# Patient Record
Sex: MALE | Race: WHITE | NOT HISPANIC OR LATINO | URBAN - METROPOLITAN AREA
[De-identification: names, ages, dates, MRNs, and addresses within clinical notes are randomized per-mention and may not be internally consistent; named-entity substitution may affect disease eponyms.]

---

## 2018-01-20 ENCOUNTER — EMERGENCY (EMERGENCY)
Facility: HOSPITAL | Age: 28
LOS: 1 days | Discharge: HOME | End: 2018-01-20
Attending: EMERGENCY MEDICINE
Payer: COMMERCIAL

## 2018-01-20 VITALS
TEMPERATURE: 98 F | DIASTOLIC BLOOD PRESSURE: 76 MMHG | HEIGHT: 68 IN | WEIGHT: 164.91 LBS | RESPIRATION RATE: 16 BRPM | OXYGEN SATURATION: 99 % | HEART RATE: 84 BPM | SYSTOLIC BLOOD PRESSURE: 128 MMHG

## 2018-01-20 DIAGNOSIS — Z98.890 OTHER SPECIFIED POSTPROCEDURAL STATES: Chronic | ICD-10-CM

## 2018-01-20 PROCEDURE — 93010 ELECTROCARDIOGRAM REPORT: CPT

## 2018-01-20 PROCEDURE — 93005 ELECTROCARDIOGRAM TRACING: CPT

## 2018-01-20 PROCEDURE — 99283 EMERGENCY DEPT VISIT LOW MDM: CPT | Mod: 25

## 2018-01-20 PROCEDURE — 99284 EMERGENCY DEPT VISIT MOD MDM: CPT

## 2018-01-20 NOTE — ED ADULT TRIAGE NOTE - CHIEF COMPLAINT QUOTE
Patient reports feeling like he was going to pass out and sat down in kitchen to drink H20, patient reports he passed out sitting in chair witnessed by family. Patient denies PMH, denies any active pain or discomfort. reports malaise.

## 2018-01-20 NOTE — ED PROVIDER NOTE - MEDICAL DECISION MAKING DETAILS
most likely vasovagal syncope; will fu ekg if wnl will dc with pmd/cards follow up ; gave pt an option to get V hydration and labs or he can hydrate at home- pt has no symptoms at this time prefers to go home if ekg wnl--will dc

## 2018-01-20 NOTE — ED PROVIDER NOTE - OBJECTIVE STATEMENT
27 year old with past history of syncope presents to the ER with an episode of syncope that occurred two hours prior. Father who witnessed the episode of syncope was with patient and states the patient loss consciousness for 15 seconds, the patient did not have altered mental status after gaining consciousness, and no extreme body movement occurred during LOC. Patient denies any loss of strength or movement of the extremities. Patient denies any paralysis, metallic test, or nuchal rigidity. 27 year old with past history of UC presents to the ER with an episode of syncope that occurred two hours prior. Father who witnessed the episode of syncope was with patient and states the patient loss consciousness for 15 seconds, the patient did not have altered mental status after gaining consciousness, and no extreme body movement occurred during LOC, no shaking. pt notes that has felt the syncope episode coming on - felt pressure to the whole body/ felt like will pass out got hot went to the kitchen to get water. sat down on the chair no head trauma. Denies f/c/n/v/cp/sob/palpitations/ cough/rash/headache/abd.pain/d/c/dysuria/hematuria. notes similar syncope episode in the past - due to dehydration; notes that feels a litle dehydrated again as has not been drinking water for the last few days.

## 2018-01-20 NOTE — ED PROVIDER NOTE - NEUROLOGICAL, MLM
Alert and oriented, no focal deficits, no motor or sensory deficits. cn ii-xii intact, normal finger to nose normal gait

## 2018-01-20 NOTE — ED ADULT NURSE NOTE - OBJECTIVE STATEMENT
patient brought in by family after patient have a syncopal episode that lasted approx 15 seconds. patient stated before it happen he had ringing ears. patient c/o mild headache 2/10. denies [id number] at this time. patient brought in by family after patient have a syncopal episode that lasted approx 15 seconds. patient stated before it happen he had ringing ears. patient c/o mild headache 2/10. denies dizziness at this time.

## 2018-01-24 ENCOUNTER — NON-APPOINTMENT (OUTPATIENT)
Age: 28
End: 2018-01-24

## 2018-01-24 ENCOUNTER — APPOINTMENT (OUTPATIENT)
Dept: CARDIOLOGY | Facility: CLINIC | Age: 28
End: 2018-01-24
Payer: COMMERCIAL

## 2018-01-24 VITALS
HEART RATE: 95 BPM | OXYGEN SATURATION: 95 % | HEIGHT: 68 IN | BODY MASS INDEX: 24.25 KG/M2 | DIASTOLIC BLOOD PRESSURE: 77 MMHG | WEIGHT: 160 LBS | SYSTOLIC BLOOD PRESSURE: 119 MMHG

## 2018-01-24 DIAGNOSIS — Z87.19 PERSONAL HISTORY OF OTHER DISEASES OF THE DIGESTIVE SYSTEM: ICD-10-CM

## 2018-01-24 DIAGNOSIS — Z87.898 PERSONAL HISTORY OF OTHER SPECIFIED CONDITIONS: ICD-10-CM

## 2018-01-24 PROCEDURE — 99205 OFFICE O/P NEW HI 60 MIN: CPT

## 2018-01-24 PROCEDURE — 93000 ELECTROCARDIOGRAM COMPLETE: CPT

## 2018-01-31 ENCOUNTER — FORM ENCOUNTER (OUTPATIENT)
Age: 28
End: 2018-01-31

## 2018-01-31 ENCOUNTER — OUTPATIENT (OUTPATIENT)
Dept: OUTPATIENT SERVICES | Facility: HOSPITAL | Age: 28
LOS: 1 days | End: 2018-01-31
Payer: COMMERCIAL

## 2018-01-31 DIAGNOSIS — R00.2 PALPITATIONS: ICD-10-CM

## 2018-01-31 DIAGNOSIS — Z98.890 OTHER SPECIFIED POSTPROCEDURAL STATES: Chronic | ICD-10-CM

## 2018-01-31 PROCEDURE — 93016 CV STRESS TEST SUPVJ ONLY: CPT

## 2018-01-31 PROCEDURE — 93017 CV STRESS TEST TRACING ONLY: CPT

## 2018-01-31 PROCEDURE — 93018 CV STRESS TEST I&R ONLY: CPT

## 2018-02-01 ENCOUNTER — OUTPATIENT (OUTPATIENT)
Dept: OUTPATIENT SERVICES | Facility: HOSPITAL | Age: 28
LOS: 1 days | End: 2018-02-01
Payer: COMMERCIAL

## 2018-02-01 DIAGNOSIS — Z98.890 OTHER SPECIFIED POSTPROCEDURAL STATES: Chronic | ICD-10-CM

## 2018-02-01 DIAGNOSIS — R00.2 PALPITATIONS: ICD-10-CM

## 2018-02-01 PROCEDURE — 93306 TTE W/DOPPLER COMPLETE: CPT

## 2018-02-01 PROCEDURE — 93306 TTE W/DOPPLER COMPLETE: CPT | Mod: 26

## 2018-07-06 ENCOUNTER — APPOINTMENT (OUTPATIENT)
Dept: CARDIOLOGY | Facility: CLINIC | Age: 28
End: 2018-07-06

## 2019-02-16 ENCOUNTER — TRANSCRIPTION ENCOUNTER (OUTPATIENT)
Age: 29
End: 2019-02-16

## 2019-05-11 ENCOUNTER — INPATIENT (INPATIENT)
Facility: HOSPITAL | Age: 29
LOS: 2 days | Discharge: HOME | DRG: 386 | End: 2019-05-14
Attending: STUDENT IN AN ORGANIZED HEALTH CARE EDUCATION/TRAINING PROGRAM | Admitting: STUDENT IN AN ORGANIZED HEALTH CARE EDUCATION/TRAINING PROGRAM
Payer: COMMERCIAL

## 2019-05-11 VITALS
RESPIRATION RATE: 20 BRPM | HEART RATE: 94 BPM | WEIGHT: 139.99 LBS | OXYGEN SATURATION: 98 % | DIASTOLIC BLOOD PRESSURE: 92 MMHG | SYSTOLIC BLOOD PRESSURE: 145 MMHG | HEIGHT: 68 IN | TEMPERATURE: 98 F

## 2019-05-11 DIAGNOSIS — Z98.890 OTHER SPECIFIED POSTPROCEDURAL STATES: Chronic | ICD-10-CM

## 2019-05-11 LAB
ALBUMIN SERPL ELPH-MCNC: 4 G/DL — SIGNIFICANT CHANGE UP (ref 3.3–5.2)
ALP SERPL-CCNC: 72 U/L — SIGNIFICANT CHANGE UP (ref 40–120)
ALT FLD-CCNC: 19 U/L — SIGNIFICANT CHANGE UP
ANION GAP SERPL CALC-SCNC: 13 MMOL/L — SIGNIFICANT CHANGE UP (ref 5–17)
AST SERPL-CCNC: 19 U/L — SIGNIFICANT CHANGE UP
BILIRUB SERPL-MCNC: 0.5 MG/DL — SIGNIFICANT CHANGE UP (ref 0.4–2)
BUN SERPL-MCNC: 7 MG/DL — LOW (ref 8–20)
CALCIUM SERPL-MCNC: 9.9 MG/DL — SIGNIFICANT CHANGE UP (ref 8.6–10.2)
CHLORIDE SERPL-SCNC: 99 MMOL/L — SIGNIFICANT CHANGE UP (ref 98–107)
CO2 SERPL-SCNC: 27 MMOL/L — SIGNIFICANT CHANGE UP (ref 22–29)
CREAT SERPL-MCNC: 0.76 MG/DL — SIGNIFICANT CHANGE UP (ref 0.5–1.3)
EOSINOPHIL NFR BLD AUTO: 3 % — SIGNIFICANT CHANGE UP (ref 0–6)
GLUCOSE SERPL-MCNC: 90 MG/DL — SIGNIFICANT CHANGE UP (ref 70–115)
HCT VFR BLD CALC: 36.9 % — LOW (ref 42–52)
HCT VFR BLD CALC: 42.3 % — SIGNIFICANT CHANGE UP (ref 42–52)
HGB BLD-MCNC: 12.4 G/DL — LOW (ref 14–18)
HGB BLD-MCNC: 14.3 G/DL — SIGNIFICANT CHANGE UP (ref 14–18)
LACTATE BLDV-MCNC: 0.9 MMOL/L — SIGNIFICANT CHANGE UP (ref 0.5–2)
LIDOCAIN IGE QN: 18 U/L — LOW (ref 22–51)
LYMPHOCYTES # BLD AUTO: 26 % — SIGNIFICANT CHANGE UP (ref 20–55)
MCHC RBC-ENTMCNC: 28.4 PG — SIGNIFICANT CHANGE UP (ref 27–31)
MCHC RBC-ENTMCNC: 28.6 PG — SIGNIFICANT CHANGE UP (ref 27–31)
MCHC RBC-ENTMCNC: 33.6 G/DL — SIGNIFICANT CHANGE UP (ref 32–36)
MCHC RBC-ENTMCNC: 33.8 G/DL — SIGNIFICANT CHANGE UP (ref 32–36)
MCV RBC AUTO: 84.6 FL — SIGNIFICANT CHANGE UP (ref 80–94)
MCV RBC AUTO: 84.6 FL — SIGNIFICANT CHANGE UP (ref 80–94)
MONOCYTES NFR BLD AUTO: 15 % — HIGH (ref 3–10)
NEUTROPHILS NFR BLD AUTO: 44 % — SIGNIFICANT CHANGE UP (ref 37–73)
NEUTS BAND # BLD: 5 % — SIGNIFICANT CHANGE UP (ref 0–8)
PLAT MORPH BLD: NORMAL — SIGNIFICANT CHANGE UP
PLATELET # BLD AUTO: 166 K/UL — SIGNIFICANT CHANGE UP (ref 150–400)
PLATELET # BLD AUTO: 208 K/UL — SIGNIFICANT CHANGE UP (ref 150–400)
POTASSIUM SERPL-MCNC: 4.1 MMOL/L — SIGNIFICANT CHANGE UP (ref 3.5–5.3)
POTASSIUM SERPL-SCNC: 4.1 MMOL/L — SIGNIFICANT CHANGE UP (ref 3.5–5.3)
PROT SERPL-MCNC: 7 G/DL — SIGNIFICANT CHANGE UP (ref 6.6–8.7)
RBC # BLD: 4.36 M/UL — LOW (ref 4.6–6.2)
RBC # BLD: 5 M/UL — SIGNIFICANT CHANGE UP (ref 4.6–6.2)
RBC # FLD: 12.4 % — SIGNIFICANT CHANGE UP (ref 11–15.6)
RBC # FLD: 12.4 % — SIGNIFICANT CHANGE UP (ref 11–15.6)
RBC BLD AUTO: NORMAL — SIGNIFICANT CHANGE UP
SODIUM SERPL-SCNC: 139 MMOL/L — SIGNIFICANT CHANGE UP (ref 135–145)
VARIANT LYMPHS # BLD: 7 % — HIGH (ref 0–6)
WBC # BLD: 5.3 K/UL — SIGNIFICANT CHANGE UP (ref 4.8–10.8)
WBC # BLD: 6.4 K/UL — SIGNIFICANT CHANGE UP (ref 4.8–10.8)
WBC # FLD AUTO: 5.3 K/UL — SIGNIFICANT CHANGE UP (ref 4.8–10.8)
WBC # FLD AUTO: 6.4 K/UL — SIGNIFICANT CHANGE UP (ref 4.8–10.8)

## 2019-05-11 PROCEDURE — 99285 EMERGENCY DEPT VISIT HI MDM: CPT

## 2019-05-11 PROCEDURE — 74177 CT ABD & PELVIS W/CONTRAST: CPT | Mod: 26

## 2019-05-11 RX ORDER — ACETAMINOPHEN 500 MG
975 TABLET ORAL ONCE
Refills: 0 | Status: COMPLETED | OUTPATIENT
Start: 2019-05-11 | End: 2019-05-11

## 2019-05-11 RX ORDER — SODIUM CHLORIDE 9 MG/ML
1000 INJECTION INTRAMUSCULAR; INTRAVENOUS; SUBCUTANEOUS ONCE
Refills: 0 | Status: COMPLETED | OUTPATIENT
Start: 2019-05-11 | End: 2019-05-11

## 2019-05-11 RX ORDER — SODIUM CHLORIDE 9 MG/ML
1000 INJECTION INTRAMUSCULAR; INTRAVENOUS; SUBCUTANEOUS
Refills: 0 | Status: DISCONTINUED | OUTPATIENT
Start: 2019-05-11 | End: 2019-05-12

## 2019-05-11 RX ADMIN — SODIUM CHLORIDE 1000 MILLILITER(S): 9 INJECTION INTRAMUSCULAR; INTRAVENOUS; SUBCUTANEOUS at 20:30

## 2019-05-11 RX ADMIN — Medication 975 MILLIGRAM(S): at 21:00

## 2019-05-11 RX ADMIN — Medication 975 MILLIGRAM(S): at 20:55

## 2019-05-11 RX ADMIN — SODIUM CHLORIDE 1000 MILLILITER(S): 9 INJECTION INTRAMUSCULAR; INTRAVENOUS; SUBCUTANEOUS at 20:55

## 2019-05-11 RX ADMIN — SODIUM CHLORIDE 2000 MILLILITER(S): 9 INJECTION INTRAMUSCULAR; INTRAVENOUS; SUBCUTANEOUS at 16:45

## 2019-05-11 NOTE — ED ADULT NURSE NOTE - OBJECTIVE STATEMENT
Patient presents to ER complaining of lower abdominal pain X 2 days, reports bloody stools this AM, denies fever, no N/V, resp even/unlabored, lungs CTAB, HX of ulcerative colitis.

## 2019-05-11 NOTE — ED STATDOCS - OBJECTIVE STATEMENT
27 y/o M pt with PMHx ulcerative colitis presents to the ED c/o abdominal pain beginning 4 days ago.  He had episodes of vomiting 6 days ago, then felt well for 2 days, and 4 days ago began to feel gradual onset, waxing and waning, epigastric cramping radiating to his LLQ.  Pt reports watery diarrhea for the past 4 days, and today began to have bloody diarrhea.  States his current pain is 2/10.  Pt's last CT was approx 5+ years ago.  Denies fever, chills, ear pain, throat pain, CP, SOB, dysuria, hematuria, back pain, HA, visual changes.  No further acute complaints at this time.

## 2019-05-11 NOTE — ED STATDOCS - PROGRESS NOTE DETAILS
28 year old male with PMHx of [place] presents to the ED c/o 4 days of LLQ abd pain associated with vomiting and bloody diarrhea.  Pt used [facility] in [place]. LLQ TTP. HPI/ ROS/ PEx as stated above. CT abd and labs ordered. 28 year old male with PMHx of US presents to the ED c/o 4 days of LLQ abd pain associated with vomiting and bloody diarrhea.  Pt sees [facility] in [place]. LLQ TTP. HPI/ ROS/ PEx as stated above. CT abd and labs ordered. signout from [facility] consult placed after reviewing ct abd/pelvis will admit to surgery

## 2019-05-11 NOTE — ED STATDOCS - ATTENDING CONTRIBUTION TO CARE
I, Dr. Olivo, performed the initial face to face bedside interview with this patient regarding history of present illness, review of symptoms and relevant past medical, social and family history.  I completed an independent physical examination.  I was the initial provider who evaluated this patient. I have signed out the follow up of any pending tests (i.e. labs, radiological studies) to the ACP.  I have communicated the patient’s plan of care and disposition with the ACP.

## 2019-05-11 NOTE — ED ADULT NURSE NOTE - NSIMPLEMENTINTERV_GEN_ALL_ED
Implemented All Universal Safety Interventions:  [place] to call system. Call bell, personal items and telephone within reach. Instruct patient to call for assistance. Room bathroom lighting operational. Non-slip footwear when patient is off stretcher. Physically safe environment: no spills, clutter or unnecessary equipment. Stretcher in lowest position, wheels locked, appropriate side rails in place.

## 2019-05-11 NOTE — ED STATDOCS - CHPI ED SYMPTOM NEG
no fever/no hematuria/chills, ear pain, throat pain, CP, SOB, back pain, HA, visual changes/no dysuria

## 2019-05-11 NOTE — ED ADULT TRIAGE NOTE - CHIEF COMPLAINT QUOTE
Patient arrived to ED today with c/o ulcerative colitis attack, diarrhea, abdominal cramping, sharp pains to his abdomen, and blood in his stools.

## 2019-05-12 DIAGNOSIS — R19.7 DIARRHEA, UNSPECIFIED: ICD-10-CM

## 2019-05-12 DIAGNOSIS — K56.1 INTUSSUSCEPTION: ICD-10-CM

## 2019-05-12 DIAGNOSIS — K51.011 ULCERATIVE (CHRONIC) PANCOLITIS WITH RECTAL BLEEDING: ICD-10-CM

## 2019-05-12 PROBLEM — K51.90 ULCERATIVE COLITIS, UNSPECIFIED, WITHOUT COMPLICATIONS: Chronic | Status: ACTIVE | Noted: 2018-01-20

## 2019-05-12 LAB
ANION GAP SERPL CALC-SCNC: 13 MMOL/L — SIGNIFICANT CHANGE UP (ref 5–17)
BASOPHILS # BLD AUTO: 0 K/UL — SIGNIFICANT CHANGE UP (ref 0–0.2)
BASOPHILS NFR BLD AUTO: 0.1 % — SIGNIFICANT CHANGE UP (ref 0–2)
BUN SERPL-MCNC: 5 MG/DL — LOW (ref 8–20)
CALCIUM SERPL-MCNC: 8.5 MG/DL — LOW (ref 8.6–10.2)
CHLORIDE SERPL-SCNC: 103 MMOL/L — SIGNIFICANT CHANGE UP (ref 98–107)
CO2 SERPL-SCNC: 24 MMOL/L — SIGNIFICANT CHANGE UP (ref 22–29)
CREAT SERPL-MCNC: 0.77 MG/DL — SIGNIFICANT CHANGE UP (ref 0.5–1.3)
EOSINOPHIL # BLD AUTO: 0.1 K/UL — SIGNIFICANT CHANGE UP (ref 0–0.5)
EOSINOPHIL NFR BLD AUTO: 1.3 % — SIGNIFICANT CHANGE UP (ref 0–5)
GLUCOSE SERPL-MCNC: 91 MG/DL — SIGNIFICANT CHANGE UP (ref 70–115)
HCT VFR BLD CALC: 38.3 % — LOW (ref 42–52)
HGB BLD-MCNC: 12.7 G/DL — LOW (ref 14–18)
LYMPHOCYTES # BLD AUTO: 1.3 K/UL — SIGNIFICANT CHANGE UP (ref 1–4.8)
LYMPHOCYTES # BLD AUTO: 18.7 % — LOW (ref 20–55)
MAGNESIUM SERPL-MCNC: 1.4 MG/DL — LOW (ref 1.6–2.6)
MCHC RBC-ENTMCNC: 28.3 PG — SIGNIFICANT CHANGE UP (ref 27–31)
MCHC RBC-ENTMCNC: 33.2 G/DL — SIGNIFICANT CHANGE UP (ref 32–36)
MCV RBC AUTO: 85.5 FL — SIGNIFICANT CHANGE UP (ref 80–94)
MONOCYTES # BLD AUTO: 1.2 K/UL — HIGH (ref 0–0.8)
MONOCYTES NFR BLD AUTO: 16.5 % — HIGH (ref 3–10)
NEUTROPHILS # BLD AUTO: 4.4 K/UL — SIGNIFICANT CHANGE UP (ref 1.8–8)
NEUTROPHILS NFR BLD AUTO: 63.4 % — SIGNIFICANT CHANGE UP (ref 37–73)
PHOSPHATE SERPL-MCNC: 3.3 MG/DL — SIGNIFICANT CHANGE UP (ref 2.4–4.7)
PLATELET # BLD AUTO: 177 K/UL — SIGNIFICANT CHANGE UP (ref 150–400)
POTASSIUM SERPL-MCNC: 3.8 MMOL/L — SIGNIFICANT CHANGE UP (ref 3.5–5.3)
POTASSIUM SERPL-SCNC: 3.8 MMOL/L — SIGNIFICANT CHANGE UP (ref 3.5–5.3)
RBC # BLD: 4.48 M/UL — LOW (ref 4.6–6.2)
RBC # FLD: 12.6 % — SIGNIFICANT CHANGE UP (ref 11–15.6)
SODIUM SERPL-SCNC: 140 MMOL/L — SIGNIFICANT CHANGE UP (ref 135–145)
WBC # BLD: 7 K/UL — SIGNIFICANT CHANGE UP (ref 4.8–10.8)
WBC # FLD AUTO: 7 K/UL — SIGNIFICANT CHANGE UP (ref 4.8–10.8)

## 2019-05-12 PROCEDURE — 99221 1ST HOSP IP/OBS SF/LOW 40: CPT

## 2019-05-12 PROCEDURE — 99222 1ST HOSP IP/OBS MODERATE 55: CPT

## 2019-05-12 PROCEDURE — 99232 SBSQ HOSP IP/OBS MODERATE 35: CPT

## 2019-05-12 RX ORDER — POTASSIUM CHLORIDE 20 MEQ
20 PACKET (EA) ORAL ONCE
Refills: 0 | Status: COMPLETED | OUTPATIENT
Start: 2019-05-12 | End: 2019-05-12

## 2019-05-12 RX ORDER — HYDROMORPHONE HYDROCHLORIDE 2 MG/ML
0.5 INJECTION INTRAMUSCULAR; INTRAVENOUS; SUBCUTANEOUS EVERY 4 HOURS
Refills: 0 | Status: DISCONTINUED | OUTPATIENT
Start: 2019-05-12 | End: 2019-05-14

## 2019-05-12 RX ORDER — PIPERACILLIN AND TAZOBACTAM 4; .5 G/20ML; G/20ML
3.38 INJECTION, POWDER, LYOPHILIZED, FOR SOLUTION INTRAVENOUS EVERY 8 HOURS
Refills: 0 | Status: DISCONTINUED | OUTPATIENT
Start: 2019-05-12 | End: 2019-05-14

## 2019-05-12 RX ORDER — SODIUM CHLORIDE 9 MG/ML
1000 INJECTION, SOLUTION INTRAVENOUS
Refills: 0 | Status: DISCONTINUED | OUTPATIENT
Start: 2019-05-12 | End: 2019-05-14

## 2019-05-12 RX ORDER — ENOXAPARIN SODIUM 100 MG/ML
40 INJECTION SUBCUTANEOUS DAILY
Refills: 0 | Status: DISCONTINUED | OUTPATIENT
Start: 2019-05-12 | End: 2019-05-14

## 2019-05-12 RX ORDER — HYDROCORTISONE 20 MG
100 TABLET ORAL THREE TIMES A DAY
Refills: 0 | Status: DISCONTINUED | OUTPATIENT
Start: 2019-05-12 | End: 2019-05-14

## 2019-05-12 RX ORDER — MAGNESIUM SULFATE 500 MG/ML
2 VIAL (ML) INJECTION ONCE
Refills: 0 | Status: COMPLETED | OUTPATIENT
Start: 2019-05-12 | End: 2019-05-12

## 2019-05-12 RX ORDER — SODIUM CHLORIDE 9 MG/ML
1000 INJECTION, SOLUTION INTRAVENOUS ONCE
Refills: 0 | Status: COMPLETED | OUTPATIENT
Start: 2019-05-12 | End: 2019-05-12

## 2019-05-12 RX ORDER — ACETAMINOPHEN 500 MG
650 TABLET ORAL EVERY 6 HOURS
Refills: 0 | Status: DISCONTINUED | OUTPATIENT
Start: 2019-05-12 | End: 2019-05-14

## 2019-05-12 RX ORDER — ONDANSETRON 8 MG/1
4 TABLET, FILM COATED ORAL EVERY 6 HOURS
Refills: 0 | Status: DISCONTINUED | OUTPATIENT
Start: 2019-05-12 | End: 2019-05-14

## 2019-05-12 RX ORDER — HYDROMORPHONE HYDROCHLORIDE 2 MG/ML
1 INJECTION INTRAMUSCULAR; INTRAVENOUS; SUBCUTANEOUS EVERY 4 HOURS
Refills: 0 | Status: DISCONTINUED | OUTPATIENT
Start: 2019-05-12 | End: 2019-05-14

## 2019-05-12 RX ADMIN — SODIUM CHLORIDE 2000 MILLILITER(S): 9 INJECTION, SOLUTION INTRAVENOUS at 05:05

## 2019-05-12 RX ADMIN — SODIUM CHLORIDE 75 MILLILITER(S): 9 INJECTION INTRAMUSCULAR; INTRAVENOUS; SUBCUTANEOUS at 00:30

## 2019-05-12 RX ADMIN — PIPERACILLIN AND TAZOBACTAM 25 GRAM(S): 4; .5 INJECTION, POWDER, LYOPHILIZED, FOR SOLUTION INTRAVENOUS at 08:16

## 2019-05-12 RX ADMIN — Medication 650 MILLIGRAM(S): at 12:23

## 2019-05-12 RX ADMIN — Medication 50 MILLIEQUIVALENT(S): at 14:00

## 2019-05-12 RX ADMIN — PIPERACILLIN AND TAZOBACTAM 25 GRAM(S): 4; .5 INJECTION, POWDER, LYOPHILIZED, FOR SOLUTION INTRAVENOUS at 23:09

## 2019-05-12 RX ADMIN — SODIUM CHLORIDE 150 MILLILITER(S): 9 INJECTION, SOLUTION INTRAVENOUS at 06:13

## 2019-05-12 RX ADMIN — Medication 650 MILLIGRAM(S): at 12:47

## 2019-05-12 RX ADMIN — PIPERACILLIN AND TAZOBACTAM 25 GRAM(S): 4; .5 INJECTION, POWDER, LYOPHILIZED, FOR SOLUTION INTRAVENOUS at 16:27

## 2019-05-12 RX ADMIN — Medication 50 GRAM(S): at 12:57

## 2019-05-12 RX ADMIN — Medication 100 MILLIGRAM(S): at 22:38

## 2019-05-12 RX ADMIN — SODIUM CHLORIDE 150 MILLILITER(S): 9 INJECTION, SOLUTION INTRAVENOUS at 22:38

## 2019-05-12 NOTE — H&P ADULT - PROBLEM SELECTOR PLAN 1
-admit to ACS  -NPO/IVF  -blood cultures  -zosyn  -repeat CT imaging to assess if intussusception still present  -GI consultation  -dvt ppx

## 2019-05-12 NOTE — CONSULT NOTE ADULT - PROBLEM SELECTOR RECOMMENDATION 9
with mild intermitant bleeding only. The patients's presentation is most consistant with progression of his ulcerative colitis to invole the entire colon with a flare. Underlying superimposed infectious colitis should be excluded but is unlikely. He will submit stool studeis for infection and I will start solucortef. Agree with clear liquid diet.

## 2019-05-12 NOTE — H&P ADULT - HISTORY OF PRESENT ILLNESS
28 year old male with history of ulcerative colitis presents to ED with one day history of severe, sharp abdominal pain associated with bright red blood per rectum. Since onset, he has had more than 10 episodes of blood per rectum and diarrhea. His pain is sharp, localized to the epigastric and left lower quadrant and does not radiate anywhere. Pain seemed to be temporarily relieved after a bowel movement. He feels nauseous but did not experience emesis. last meal was last night prior to admission. Never experienced this amount of blood per rectum or severe abdominal pain. Stopped taking ulcerative colitis medication a few weeks ago. Last colonoscopy was about 3 years ago and demonstrated ulcers. Usually has an exacerbation about once a month. GI physician is Dr. Funk

## 2019-05-12 NOTE — ED ADULT NURSE REASSESSMENT NOTE - NS ED NURSE REASSESS COMMENT FT1
Patient complaining of pain to right arm in area of saline lock. Stated is radiating up arm. Negative s/s phlebitis or infiltration. Saline lock removed, new IV site obtained. Patient verbalized an improvement in pain once saline lock removed from right arm. Will continue to monitor.

## 2019-05-12 NOTE — CONSULT NOTE ADULT - SUBJECTIVE AND OBJECTIVE BOX
Patient is a 28y old  Male who presents with a chief complaint of possible intussusception (12 May 2019 01:14)      HPI:  28 year old male with history of ulcerative colitis diagnosed 12 years ago treated with colozal in the past as well as urceris which resulted in constipation. The UC was apparently left sided. No recent colonoscopies, last about three years ago and followed by Dr. Funk. He usually experiences about three BMs per day, usually formed but with occasional cramping and a loose stool. Over the years he would sometimes see a small amount of blood in his stool. He was contolled for several years on Colozal but balsalazide was ineffective. He was seen by Dr. Funk about one month with ongoing symptoms and was prescirbed balsalzide which was ineffective and was stopped recently by the patient. Four days ago the diarrhea and cramping worsened and on this occasion the cramping was pronounced in the upper abdomen. He came to the ER yesterday and for the first time has been febrile to as high as 102. He notes small amount of blood int he stool  on about half of his BMs which are occuring every 90 minutes. There is no vomiting. Other than colozal. balsalized and uceris he has not been on any other med. The cat scan shows diffuse colitis and an intussusception of a loop of small bowel without obstruction. He has not been on any antibiotics recently and no recent travel. He works for the [employer]. No similarly afflicted family members.    REVIEW OF SYSTEMS:    CONSTITUTIONAL: No weight loss, or fatigue, + fever  EYES: No eye pain, visual disturbances, or discharge  ENMT:  No difficulty hearing, tinnitus, vertigo; No sinus or throat pain  NECK: No pain or stiffness  RESPIRATORY: No cough, wheezing, chills or hemoptysis; No shortness of breath  CARDIOVASCULAR: No chest pain, palpitations, dizziness, or leg swelling  GASTROINTESTINAL: as above  NEUROLOGICAL: No headaches, memory loss, loss of strength, numbness, or tremors  SKIN: No itching, burning, rashes, or lesions   LYMPH NODES: No enlarged glands  MUSCULOSKELETAL: No joint pain or swelling; No muscle, back, or extremity pain  PSYCHIATRIC: No depression, anxiety, mood swings, or difficulty sleeping  HEME/LYMPH: No easy bruising, or bleeding gums  ALLERY AND IMMUNOLOGIC: No hives or eczema      PAST MEDICAL & SURGICAL HISTORY:  Ulcerative colitis without complications, unspecified location  H/O right  shoulder surgery      FAMILY HISTORY:      SOCIAL HISTORY:  Smoking Status: [ ] Current, [ ] Former, [x ] Never  Pack Years:  Alcohol Use: social    Home Medications:      MEDICATIONS:  MEDICATIONS  (STANDING):  enoxaparin Injectable 40 milliGRAM(s) SubCutaneous daily  hydrocortisone sodium succinate Injectable 100 milliGRAM(s) IV Push three times a day  lactated ringers. 1000 milliLiter(s) (150 mL/Hr) IV Continuous <Continuous>  piperacillin/tazobactam IVPB. 3.375 Gram(s) IV Intermittent every 8 hours    MEDICATIONS  (PRN):  acetaminophen   Tablet .. 650 milliGRAM(s) Oral every 6 hours PRN Temp greater or equal to 38C (100.4F), Mild Pain (1 - 3)  HYDROmorphone  Injectable 0.5 milliGRAM(s) IV Push every 4 hours PRN Moderate Pain (4 - 6)  HYDROmorphone  Injectable 1 milliGRAM(s) IV Push every 4 hours PRN Severe Pain (7 - 10)  ondansetron Injectable 4 milliGRAM(s) IV Push every 6 hours PRN Nausea and/or Vomiting      Allergies    No Known Allergies    Intolerances        Vital Signs Last 24 Hrs  T(C): 37.1 (12 May 2019 11:13), Max: 38.9 (11 May 2019 19:44)  T(F): 98.7 (12 May 2019 11:13), Max: 102 (11 May 2019 19:44)  HR: 100 (12 May 2019 13:33) (100 - 141)  BP: 124/75 (12 May 2019 13:33) (119/64 - 132/78)  BP(mean): 82 (12 May 2019 01:14) (82 - 82)  RR: 20 (12 May 2019 13:33) (18 - 20)  SpO2: 98% (12 May 2019 13:33) (97% - 99%)        PHYSICAL EXAM:    General: Well developed; well nourished; in no acute distress  HEENT: MMM, conjunctiva and sclera clear  Lungs: Clear  Heart: Rhythm Regular, No Murmurs, tachycardia  Gastrointestinal: Soft, with mild LLQ tenderness. non-distended; Normal bowel sounds; No rebound or guarding; No Organomegaly & No Masses  Extremities: Normal range of motion, No clubbing, cyanosis or edema  Neurological: Alert and oriented x3, Non-focal  Skin: Warm and dry. No obvious rash  Rectal: No Masses, scant loose nonbloody light brown stool      LABS:                        12.7   7.0   )-----------( 177      ( 12 May 2019 09:05 )             38.3     05-12    140  |  103  |  5.0<L>  ----------------------------<  91  3.8   |  24.0  |  0.77    Ca    8.5<L>      12 May 2019 09:05  Phos  3.3     05-12  Mg     1.4     05-12    TPro  7.0  /  Alb  4.0  /  TBili  0.5  /  DBili  x   /  AST  19  /  ALT  19  /  AlkPhos  72  05-11          RADIOLOGY & ADDITIONAL STUDIES:     < from: CT Abdomen and Pelvis w/ Oral Cont and w/ IV Cont (05.11.19 @ 20:36) >   EXAM:  CT ABDOMEN AND PELVIS OC IC                          PROCEDURE DATE:  05/11/2019          INTERPRETATION:  CLINICAL INFORMATION: History of ulcerative colitis.    PROCEDURE:   Helical axial images were obtained from the domes of the diaphragm   through the pubic symphysis following the administration of oral contrast   and intravenous contrast. 95 mls of Omnipaque-300 administered without   complication. Coronal and sagittal reformats were also obtained.    COMPARISON: 8/15/2014.    FINDINGS:    LOWER CHEST: Unremarkable.    LIVER: Unremarkable.  GALLBLADDER/BILE DUCTS: No intrahepatic or extrahepatic biliary   dilatation. No radiopaque gallstone.  PANCREAS: Unremarkable.  SPLEEN: A 1.3 x 1.4 cm hypodense lesion, increased in size since prior   study.    ADRENALS: Unremarkable.  KIDNEYS/URETERS: No hydronephrosis, hydroureter or significant   perinephric stranding. Right upper pole renal scarring again noted.  BLADDER: Partially distended.  REPRODUCTIVE ORGANS: Unremarkable.    BOWEL: Diffuse colon wall thickening with mild surrounding stranding or   increased mesenteric vascularity, indicating acute flareup of known   inflammatory bowel disease. Scattered chronic submucosal fat deposition   with loss  of haustra and in the sigmoid colon, reflecting chronic   component. Proximal small bowel-small bowel intussusception in the left   abdomen (3:62 and 5:41). No significant bowel dilatation or transition   point to suggest bowel obstruction. No secondary signs of appendicitis.    PERITONEUM: No drainable fluid collection or free air. Scattered   paracolic/mesorectal lymph nodes, for example, 1.1 x 1.5 cm left   mesorectal lymph node (3:112).  VESSELS: Unremarkable.   RETROPERITONEUM: No lymphadenopathy.    ABDOMINAL WALL/SOFT TISSUES: Unremarkable.  BONES: Degenerative changes of the spine.     IMPRESSION:     Acute flareup of known inflammatory bowel disease involving the entire   colon.    Scattered paracolic/mesorectal lymph nodes, which are nonspecific and may   be inflammatory or infectious in etiology. Recommend correlation with   colonoscopy to exclude underlying neoplasm.    Proximal small bowel-small bowel intussusception in the left abdomen. No   significant bowel dilatation or transition point to suggest bowel   obstruction. Recommend follow-up as clinically indicated.     Increased size of the indeterminate splenic lesion since 8/15/2014.   Recommend comparison to interval outside study and follow-up as   clinically indicated.                MONICO STONE M.D., ATTENDING RADIOLOGIST  This document has been electronically signed. May 11 2019  9:25PM                  < end of copied text >

## 2019-05-12 NOTE — H&P ADULT - ASSESSMENT
28 year old male with history of ulcerative colitis noncompliant with medication presenting with severe abdominal pain and multiple bloody bowel movements. CT imaging concerning for possible intussusception despite oral contrast visualized in the distal colon. Clinical picture more likely ulcerative colitis exacerbation given extensive inflammation of entire colon seen on imaging.

## 2019-05-12 NOTE — ED ADULT NURSE REASSESSMENT NOTE - NS ED NURSE REASSESS COMMENT FT1
Patient A&Ox4, denies any pain or discomfort. Denies any chest pain, shortness of breath, nausea or dizziness. Respirations even & unlabored, denies any numbness or tingling. Saline lock in place, patent, negative s/s phlebitis or infiltration. IVF in progress, well tolerated. Family at bedside. Handoff report given to oncoming RN.

## 2019-05-12 NOTE — CONSULT NOTE ADULT - CONSULT REASON
Hx of ulcerative colitis and now with diarrhea, fever, and blood in stool with intussusception of a segment of small bowel on cat scan

## 2019-05-12 NOTE — ED ADULT NURSE REASSESSMENT NOTE - NS ED NURSE REASSESS COMMENT FT1
Report received from off going RN, charting as noted. Patient A&Ox4, denies any pain or discomfort. Denies any chest pain, shortness of breath, nausea or dizziness. Respirations even & unlabored, denies any numbness or tingling. Cardiac monitor in place, sinus tach. Saline lock in place, patent, negative s/s phlebitis or infiltration. IVF in progress, well tolerated. Family at bedside. Plan of care discussed, all questions answered. Will monitor.

## 2019-05-12 NOTE — ED ADULT NURSE REASSESSMENT NOTE - NS ED NURSE REASSESS COMMENT FT1
assumed pt care this am, medicated as ordered, wait ready bed on medical floor. Pt comfortable at this time except for some cramps, chart reviewed, am  here for pt.

## 2019-05-13 LAB
ANION GAP SERPL CALC-SCNC: 15 MMOL/L — SIGNIFICANT CHANGE UP (ref 5–17)
BASOPHILS # BLD AUTO: 0 K/UL — SIGNIFICANT CHANGE UP (ref 0–0.2)
BASOPHILS NFR BLD AUTO: 0.2 % — SIGNIFICANT CHANGE UP (ref 0–2)
BUN SERPL-MCNC: 3 MG/DL — LOW (ref 8–20)
C DIFF BY PCR RESULT: SIGNIFICANT CHANGE UP
C DIFF TOX GENS STL QL NAA+PROBE: SIGNIFICANT CHANGE UP
CALCIUM SERPL-MCNC: 8.2 MG/DL — LOW (ref 8.6–10.2)
CHLORIDE SERPL-SCNC: 101 MMOL/L — SIGNIFICANT CHANGE UP (ref 98–107)
CO2 SERPL-SCNC: 25 MMOL/L — SIGNIFICANT CHANGE UP (ref 22–29)
CREAT SERPL-MCNC: 0.76 MG/DL — SIGNIFICANT CHANGE UP (ref 0.5–1.3)
CULTURE RESULTS: SIGNIFICANT CHANGE UP
EOSINOPHIL # BLD AUTO: 0 K/UL — SIGNIFICANT CHANGE UP (ref 0–0.5)
EOSINOPHIL NFR BLD AUTO: 0 % — SIGNIFICANT CHANGE UP (ref 0–6)
GLUCOSE SERPL-MCNC: 105 MG/DL — SIGNIFICANT CHANGE UP (ref 70–115)
HCT VFR BLD CALC: 37.3 % — LOW (ref 42–52)
HGB BLD-MCNC: 12.7 G/DL — LOW (ref 14–18)
LYMPHOCYTES # BLD AUTO: 1 K/UL — SIGNIFICANT CHANGE UP (ref 1–4.8)
LYMPHOCYTES # BLD AUTO: 16.3 % — LOW (ref 20–55)
MAGNESIUM SERPL-MCNC: 1.9 MG/DL — SIGNIFICANT CHANGE UP (ref 1.6–2.6)
MCHC RBC-ENTMCNC: 28.4 PG — SIGNIFICANT CHANGE UP (ref 27–31)
MCHC RBC-ENTMCNC: 34 G/DL — SIGNIFICANT CHANGE UP (ref 32–36)
MCV RBC AUTO: 83.4 FL — SIGNIFICANT CHANGE UP (ref 80–94)
MONOCYTES # BLD AUTO: 0.8 K/UL — SIGNIFICANT CHANGE UP (ref 0–0.8)
MONOCYTES NFR BLD AUTO: 13.1 % — HIGH (ref 3–10)
NEUTROPHILS # BLD AUTO: 4.1 K/UL — SIGNIFICANT CHANGE UP (ref 1.8–8)
NEUTROPHILS NFR BLD AUTO: 70.1 % — SIGNIFICANT CHANGE UP (ref 37–73)
PHOSPHATE SERPL-MCNC: 3.9 MG/DL — SIGNIFICANT CHANGE UP (ref 2.4–4.7)
PLATELET # BLD AUTO: 184 K/UL — SIGNIFICANT CHANGE UP (ref 150–400)
POTASSIUM SERPL-MCNC: 3.5 MMOL/L — SIGNIFICANT CHANGE UP (ref 3.5–5.3)
POTASSIUM SERPL-SCNC: 3.5 MMOL/L — SIGNIFICANT CHANGE UP (ref 3.5–5.3)
RBC # BLD: 4.47 M/UL — LOW (ref 4.6–6.2)
RBC # FLD: 12.3 % — SIGNIFICANT CHANGE UP (ref 11–15.6)
SODIUM SERPL-SCNC: 141 MMOL/L — SIGNIFICANT CHANGE UP (ref 135–145)
SPECIMEN SOURCE: SIGNIFICANT CHANGE UP
WBC # BLD: 5.9 K/UL — SIGNIFICANT CHANGE UP (ref 4.8–10.8)
WBC # FLD AUTO: 5.9 K/UL — SIGNIFICANT CHANGE UP (ref 4.8–10.8)

## 2019-05-13 PROCEDURE — 99231 SBSQ HOSP IP/OBS SF/LOW 25: CPT

## 2019-05-13 PROCEDURE — 99233 SBSQ HOSP IP/OBS HIGH 50: CPT

## 2019-05-13 PROCEDURE — 74177 CT ABD & PELVIS W/CONTRAST: CPT | Mod: 26

## 2019-05-13 RX ORDER — POTASSIUM CHLORIDE 20 MEQ
40 PACKET (EA) ORAL ONCE
Refills: 0 | Status: COMPLETED | OUTPATIENT
Start: 2019-05-13 | End: 2019-05-13

## 2019-05-13 RX ORDER — POTASSIUM CHLORIDE 20 MEQ
10 PACKET (EA) ORAL
Refills: 0 | Status: DISCONTINUED | OUTPATIENT
Start: 2019-05-13 | End: 2019-05-13

## 2019-05-13 RX ADMIN — PIPERACILLIN AND TAZOBACTAM 25 GRAM(S): 4; .5 INJECTION, POWDER, LYOPHILIZED, FOR SOLUTION INTRAVENOUS at 10:16

## 2019-05-13 RX ADMIN — Medication 100 MILLIGRAM(S): at 15:08

## 2019-05-13 RX ADMIN — Medication 100 MILLIEQUIVALENT(S): at 16:55

## 2019-05-13 RX ADMIN — PIPERACILLIN AND TAZOBACTAM 25 GRAM(S): 4; .5 INJECTION, POWDER, LYOPHILIZED, FOR SOLUTION INTRAVENOUS at 18:42

## 2019-05-13 RX ADMIN — Medication 100 MILLIGRAM(S): at 23:03

## 2019-05-13 RX ADMIN — Medication 100 MILLIGRAM(S): at 05:33

## 2019-05-13 RX ADMIN — SODIUM CHLORIDE 150 MILLILITER(S): 9 INJECTION, SOLUTION INTRAVENOUS at 23:02

## 2019-05-13 RX ADMIN — Medication 40 MILLIEQUIVALENT(S): at 18:41

## 2019-05-13 NOTE — PROGRESS NOTE ADULT - ATTENDING COMMENTS
Seen and examined    NAD  AAOx4  abd soft, NT    likely UC flair.  Improving with treatment as per GI.    Counselled to consider f/u with colorectal surgery as well.

## 2019-05-13 NOTE — PROGRESS NOTE ADULT - ASSESSMENT
A/P: 29yo M h/o of UC p/w flair and questionable intussusception on CT, though clinically does not seem to have intussusception    -follow up CT abd/pelv rescan today  -NPO for now, will resume CLD after scan  -zosyn and hydrocortisone  -dvt ppx  -plan pending CT scan

## 2019-05-14 ENCOUNTER — TRANSCRIPTION ENCOUNTER (OUTPATIENT)
Age: 29
End: 2019-05-14

## 2019-05-14 VITALS
DIASTOLIC BLOOD PRESSURE: 76 MMHG | SYSTOLIC BLOOD PRESSURE: 120 MMHG | OXYGEN SATURATION: 97 % | RESPIRATION RATE: 18 BRPM | HEART RATE: 70 BPM | TEMPERATURE: 98 F

## 2019-05-14 LAB
ANION GAP SERPL CALC-SCNC: 12 MMOL/L — SIGNIFICANT CHANGE UP (ref 5–17)
BUN SERPL-MCNC: 6 MG/DL — LOW (ref 8–20)
CALCIUM SERPL-MCNC: 8.4 MG/DL — LOW (ref 8.6–10.2)
CHLORIDE SERPL-SCNC: 101 MMOL/L — SIGNIFICANT CHANGE UP (ref 98–107)
CO2 SERPL-SCNC: 28 MMOL/L — SIGNIFICANT CHANGE UP (ref 22–29)
CREAT SERPL-MCNC: 0.73 MG/DL — SIGNIFICANT CHANGE UP (ref 0.5–1.3)
EOSINOPHIL # BLD AUTO: 0 K/UL — SIGNIFICANT CHANGE UP (ref 0–0.5)
EOSINOPHIL NFR BLD AUTO: 0 % — SIGNIFICANT CHANGE UP (ref 0–5)
GLUCOSE SERPL-MCNC: 118 MG/DL — HIGH (ref 70–115)
HCT VFR BLD CALC: 38.2 % — LOW (ref 42–52)
HGB BLD-MCNC: 12.9 G/DL — LOW (ref 14–18)
LYMPHOCYTES # BLD AUTO: 1.3 K/UL — SIGNIFICANT CHANGE UP (ref 1–4.8)
LYMPHOCYTES # BLD AUTO: 19 % — LOW (ref 20–55)
MAGNESIUM SERPL-MCNC: 2 MG/DL — SIGNIFICANT CHANGE UP (ref 1.6–2.6)
MCHC RBC-ENTMCNC: 28.5 PG — SIGNIFICANT CHANGE UP (ref 27–31)
MCHC RBC-ENTMCNC: 33.8 G/DL — SIGNIFICANT CHANGE UP (ref 32–36)
MCV RBC AUTO: 84.3 FL — SIGNIFICANT CHANGE UP (ref 80–94)
MONOCYTES # BLD AUTO: 1 K/UL — HIGH (ref 0–0.8)
MONOCYTES NFR BLD AUTO: 14.5 % — HIGH (ref 3–10)
NEUTROPHILS # BLD AUTO: 4.5 K/UL — SIGNIFICANT CHANGE UP (ref 1.8–8)
NEUTROPHILS NFR BLD AUTO: 66.1 % — SIGNIFICANT CHANGE UP (ref 37–73)
PHOSPHATE SERPL-MCNC: 3.6 MG/DL — SIGNIFICANT CHANGE UP (ref 2.4–4.7)
PLATELET # BLD AUTO: 213 K/UL — SIGNIFICANT CHANGE UP (ref 150–400)
POTASSIUM SERPL-MCNC: 4 MMOL/L — SIGNIFICANT CHANGE UP (ref 3.5–5.3)
POTASSIUM SERPL-SCNC: 4 MMOL/L — SIGNIFICANT CHANGE UP (ref 3.5–5.3)
RBC # BLD: 4.53 M/UL — LOW (ref 4.6–6.2)
RBC # FLD: 12.6 % — SIGNIFICANT CHANGE UP (ref 11–15.6)
SODIUM SERPL-SCNC: 141 MMOL/L — SIGNIFICANT CHANGE UP (ref 135–145)
WBC # BLD: 6.8 K/UL — SIGNIFICANT CHANGE UP (ref 4.8–10.8)
WBC # FLD AUTO: 6.8 K/UL — SIGNIFICANT CHANGE UP (ref 4.8–10.8)

## 2019-05-14 PROCEDURE — 87507 IADNA-DNA/RNA PROBE TQ 12-25: CPT

## 2019-05-14 PROCEDURE — 84100 ASSAY OF PHOSPHORUS: CPT

## 2019-05-14 PROCEDURE — 87493 C DIFF AMPLIFIED PROBE: CPT

## 2019-05-14 PROCEDURE — 83735 ASSAY OF MAGNESIUM: CPT

## 2019-05-14 PROCEDURE — 80048 BASIC METABOLIC PNL TOTAL CA: CPT

## 2019-05-14 PROCEDURE — 85027 COMPLETE CBC AUTOMATED: CPT

## 2019-05-14 PROCEDURE — 87177 OVA AND PARASITES SMEARS: CPT

## 2019-05-14 PROCEDURE — 87046 STOOL CULTR AEROBIC BACT EA: CPT

## 2019-05-14 PROCEDURE — 87045 FECES CULTURE AEROBIC BACT: CPT

## 2019-05-14 PROCEDURE — 87040 BLOOD CULTURE FOR BACTERIA: CPT

## 2019-05-14 PROCEDURE — 99238 HOSP IP/OBS DSCHRG MGMT 30/<: CPT

## 2019-05-14 PROCEDURE — 74177 CT ABD & PELVIS W/CONTRAST: CPT

## 2019-05-14 PROCEDURE — 80053 COMPREHEN METABOLIC PANEL: CPT

## 2019-05-14 PROCEDURE — 36415 COLL VENOUS BLD VENIPUNCTURE: CPT

## 2019-05-14 PROCEDURE — 83690 ASSAY OF LIPASE: CPT

## 2019-05-14 PROCEDURE — 99232 SBSQ HOSP IP/OBS MODERATE 35: CPT

## 2019-05-14 PROCEDURE — 99285 EMERGENCY DEPT VISIT HI MDM: CPT

## 2019-05-14 PROCEDURE — 83605 ASSAY OF LACTIC ACID: CPT

## 2019-05-14 RX ADMIN — Medication 50 MILLIGRAM(S): at 13:24

## 2019-05-14 RX ADMIN — Medication 100 MILLIGRAM(S): at 05:20

## 2019-05-14 RX ADMIN — PIPERACILLIN AND TAZOBACTAM 25 GRAM(S): 4; .5 INJECTION, POWDER, LYOPHILIZED, FOR SOLUTION INTRAVENOUS at 01:27

## 2019-05-14 NOTE — DISCHARGE NOTE PROVIDER - NSDCCPCAREPLAN_GEN_ALL_CORE_FT
PRINCIPAL DISCHARGE DIAGNOSIS  Diagnosis: Ulcerative colitis  Assessment and Plan of Treatment:       SECONDARY DISCHARGE DIAGNOSES  Diagnosis: Intussusception  Assessment and Plan of Treatment:

## 2019-05-14 NOTE — DISCHARGE NOTE PROVIDER - PROVIDER TOKENS
PROVIDER:[TOKEN:[[id number]]],FREE:[LAST:[[facility]],PHONE:[phone]],FAX:[(   )    -],ADDRESS:[[address] [place] [address], [address]]],FREE:[LAST:[Dr. Funk],PHONE:[phone]],FAX:[(   )    -],ADDRESS:[[address] [place] [address], [address]]]

## 2019-05-14 NOTE — PROGRESS NOTE ADULT - ASSESSMENT
A/P: 29yo M h/o of UC p/w flair and improved proctocolitis with no intussusception    - advance to low fiber diet  - zosyn dc'ed; prednisone started  - dvt ppx  - dispo planning with possible prednisone taper  - ambulate A/P: 27yo M h/o of UC p/w flair and improved proctocolitis with no intussusception    - advance to low fiber diet  - zosyn dc'ed; prednisone started  - dvt ppx  - dispo planning; f/u GI on prednisone plan  - ambulate

## 2019-05-14 NOTE — DISCHARGE NOTE PROVIDER - HOSPITAL COURSE
5/12 - 28 year old male with history of ulcerative colitis presents to ED with one day history of severe, sharp abdominal pain associated with bright red blood per rectum. Since onset, he has had more than 10 episodes of blood per rectum and diarrhea. His pain is sharp, localized to the epigastric and left lower quadrant and does not radiate anywhere. Pain seemed to be temporarily relieved after a bowel movement. He feels nauseous but did not experience emesis. last meal was last night prior to admission. Never experienced this amount of blood per rectum or severe abdominal pain. Stopped taking ulcerative colitis medication a few weeks ago. Last colonoscopy was about 3 years ago and demonstrated ulcers. Usually has an exacerbation about once a month. GI physician is Dr. Funk    CT imaging concerning for possible intussusception despite oral contrast visualized in the distal colon. Clinical picture more likely ulcerative colitis exacerbation given extensive inflammation of entire colon seen on imaging.     Admitted  to ACS service, made NPO/IVF, started on iv abx.     GI consulted and agreed patient havinf ulcerative pancolitis flare-up. submited stool studeis for infection and started on solucortef. Patient advanced to clear liquid diet.    Repeat CT scan showed no intussusception, pain resolved , having bowel movements, diet was advanced and tolerating low fiber.     Cleared by GI and acs attending for safe d/c home with outpatient follow-up.             ****You are to finish your course of steroids. You will need to start at the higher dose and be tapered down each week. Start with 50 mg, 45mg the next week, 40 the next week, and so on.

## 2019-05-14 NOTE — DISCHARGE NOTE PROVIDER - CARE PROVIDER_API CALL
Makayla Blair)  ColonRectal Surgery; Surgery  [address] [place] [address]  Phone: [phone]  Fax: [phone]  Follow Up Time:     [facility] clinic,   [address], first [place], [place], [address]  Phone: [phone]  Fax: (   )    -  Follow Up Time:     Dr. Funk,   [facility] [address] [place], [place], [address]  Phone: [phone]  Fax: (   )    -  Follow Up Time:

## 2019-05-14 NOTE — DISCHARGE NOTE NURSING/CASE MANAGEMENT/SOCIAL WORK - NSDCDPATPORTLINK_GEN_ALL_CORE
You can access the [email or web address], offered by [facility], by registering with the following website: [email or web address]

## 2019-05-14 NOTE — DISCHARGE NOTE PROVIDER - NSDCFUADDINST_GEN_ALL_CORE_FT
NOTIFY YOUR SURGEON IF: any worsening abdominal pain, concerning symptoms, chest pain, shortness of breath, any fever (over 100.4 F) or chills, persistent nausea/vomiting, persistent diarrhea.  PAIN:  take tylenol or ibuprofen for pain,   FOLLOW-UP: Please follow up with your GI doctor, Dr. Funk and [facility] clinic [phone] in one week regarding your hospitalization. Call for appointment upon discharge. You are also recommended to follow-up with colorectal surgery.

## 2019-05-14 NOTE — PROGRESS NOTE ADULT - PROBLEM SELECTOR PLAN 1
improving with current therapy. On solid food and tolerating. Will stop antibiotics and switch ot PO prednisone 50mg daily and should wean by 5mg per week. He shouldm f/u with Dr Funk in Dr. Kline's office for institution of a biologic. Will see prn your request.
Await repeat CT scan if no SB pathology can advance diet to low residue, low fat, lactose restricted. Continue current IV steroids for now. Once diet advanced to solids and tolerated will convert to oral Prednisone.

## 2019-05-14 NOTE — DISCHARGE NOTE PROVIDER - CARE PROVIDERS DIRECT ADDRESSES
[email or web address],DirectAddress_[email or web address],DirectAddress_Unknown

## 2019-05-15 LAB
CULTURE RESULTS: SIGNIFICANT CHANGE UP
CULTURE RESULTS: SIGNIFICANT CHANGE UP
SPECIMEN SOURCE: SIGNIFICANT CHANGE UP
SPECIMEN SOURCE: SIGNIFICANT CHANGE UP

## 2019-06-21 ENCOUNTER — APPOINTMENT (OUTPATIENT)
Dept: GASTROENTEROLOGY | Facility: CLINIC | Age: 29
End: 2019-06-21
Payer: COMMERCIAL

## 2019-06-21 VITALS
HEART RATE: 89 BPM | SYSTOLIC BLOOD PRESSURE: 132 MMHG | DIASTOLIC BLOOD PRESSURE: 82 MMHG | RESPIRATION RATE: 15 BRPM | HEIGHT: 68 IN | WEIGHT: 150 LBS | BODY MASS INDEX: 22.73 KG/M2 | OXYGEN SATURATION: 96 %

## 2019-06-21 DIAGNOSIS — R00.2 PALPITATIONS: ICD-10-CM

## 2019-06-21 DIAGNOSIS — R55 SYNCOPE AND COLLAPSE: ICD-10-CM

## 2019-06-21 PROCEDURE — 99215 OFFICE O/P EST HI 40 MIN: CPT

## 2019-06-21 RX ORDER — PREDNISONE 10 MG/1
10 TABLET ORAL
Refills: 0 | Status: ACTIVE | COMMUNITY

## 2019-06-21 RX ORDER — PREDNISONE 5 MG/1
5 TABLET ORAL
Refills: 0 | Status: ACTIVE | COMMUNITY

## 2019-06-21 RX ORDER — BUDESONIDE 9 MG/1
9 TABLET, EXTENDED RELEASE ORAL
Qty: 30 | Refills: 5 | Status: ACTIVE | COMMUNITY
Start: 2019-06-21 | End: 1900-01-01

## 2019-06-21 RX ORDER — RISEDRONATE SODIUM 5 MG/1
5 TABLET, FILM COATED ORAL
Qty: 30 | Refills: 1 | Status: ACTIVE | COMMUNITY
Start: 2019-06-21 | End: 1900-01-01

## 2019-06-21 RX ORDER — PREDNISONE 5 MG/1
5 TABLET ORAL
Qty: 70 | Refills: 0 | Status: ACTIVE | COMMUNITY
Start: 2019-06-21 | End: 1900-01-01

## 2019-06-21 NOTE — PHYSICAL EXAM
[General Appearance - Alert] : alert [General Appearance - In No Acute Distress] : in no acute distress [General Appearance - Well Nourished] : well nourished [General Appearance - Well Developed] : well developed [General Appearance - Well-Appearing] : healthy appearing [Sclera] : the sclera and conjunctiva were normal [PERRL With Normal Accommodation] : pupils were equal in size, round, and reactive to light [Extraocular Movements] : extraocular movements were intact [Outer Ear] : the ears and nose were normal in appearance [Hearing Threshold Finger Rub Not [place]] : hearing was normal [Examination Of The Oral Cavity] : the lips and gums were normal [Oropharynx] : the oropharynx was normal [Neck Appearance] : the appearance of the neck was normal [Auscultation Breath Sounds / Voice Sounds] : lungs were clear to auscultation bilaterally [Heart Rate And Rhythm] : heart rate was normal and rhythm regular [Heart Sounds] : normal S1 and S2 [Heart Sounds Gallop] : no gallops [Murmurs] : no murmurs [Heart Sounds Pericardial Friction Rub] : no pericardial rub [Bowel Sounds] : normal bowel sounds [Abdomen Tenderness] : non-tender [Abdomen Soft] : soft [Abdomen Mass (___ Cm)] : no abdominal mass palpated [No CVA Tenderness] : no ~M costovertebral angle tenderness [No Spinal Tenderness] : no spinal tenderness [Abnormal Walk] : normal gait [Nail Clubbing] : no clubbing  or cyanosis of the fingernails [Musculoskeletal - Swelling] : no joint swelling seen [Motor Tone] : muscle strength and tone were normal [Skin Color & Pigmentation] : normal skin color and pigmentation [Skin Turgor] : normal skin turgor [] : no rash [Motor Exam] : the motor exam was normal [No Focal Deficits] : no focal deficits [Oriented To Time, Place, And Person] : oriented to person, place, and time [Impaired Insight] : insight and judgment were intact [Affect] : the affect was normal

## 2019-06-21 NOTE — HISTORY OF PRESENT ILLNESS
[de-identified] : He has a history of ulcerative colitis dating back to the age of 16 at which time an initial colonoscopy revealed universal involvement. He has undergone a number of colonoscopies since that time while on therapy to usually reveal some residual inflammation of the left colon but can be anywhere from moderate to very mild. He had previously been prescribed Colozal which was very effective at controlling her symptoms but this medication is no longer available in the generic balsalazide is apparently ineffective for him. He has also been previously treated with Uceris she states results and some constipation. He last underwent a colonoscopy about 3 years ago by Dr. Funk and the result is unavailable for my review. In mid May he was hospitalized with frequent bloody diarrhea and cramping. Stool studies were negative he was treated with Solu-Cortef and discharged on a decreasing dose of prednisone. Initial CAT scan reveals small bowel intussusception without obstruction and a followup several days later simply revealed universal colitis but no intussusception. He is currently taking prednisone 25 mg per day which she is weaning by 5 mg weekly. As he is weaning the prednisone he is having somewhat more frequent looser stools averaging 5 daily but no rectal bleeding and only occasional lower abdominal cramping. There is no fever, nausea or vomiting. He has never been on any biologics.

## 2019-06-21 NOTE — ASSESSMENT
[FreeTextEntry1] : At this time I recommended he continue to wean her prednisone by 5 mg weekly. He will start uceris 9 mg p.o. q.a.m. and if he develops constipation he will notify me that even discontinue the medication. Will undergo a CBC, basic metabolic profile, liver function tests, C-reactive protein, on interferon gold and hepatitis B and C. serologies in anticipation of starting Remicade at a dose of 400 mg per infusion which is somewhat more than 5 mg per kilogram in this patient. We'll also start Actonel 5 mg p.o. q.d. which she will take until he is finished with his course of prednisone. He will be seen again in 2 months for further followup. He will eventually require a followup colonoscopy but I would like to get him into remission prior.

## 2019-07-05 NOTE — PROGRESS NOTE ADULT - SUBJECTIVE AND OBJECTIVE BOX
"Principal Problem:Closed fracture of left hip    Principal Orthopedic Problem: S/P L hip ORIF    Interval History: limited mobility    Review of patient's allergies indicates:   Allergen Reactions    Codeine Nausea Only     Happened 50 years ago        Current Facility-Administered Medications   Medication    aspirin tablet 325 mg    bisacodyl suppository 10 mg    famotidine tablet 20 mg    methocarbamol tablet 500 mg    morphine injection 2 mg    ondansetron disintegrating tablet 8 mg    oxyCODONE immediate release tablet 5 mg    oxyCODONE immediate release tablet Tab 10 mg    polyethylene glycol packet 17 g    sodium chloride 0.9% flush 5 mL    tiotropium inhalation capsule 18 mcg    zolpidem tablet 5 mg     Objective:     Vital Signs (Most Recent):  Temp: 97.6 °F (36.4 °C) (07/05/19 0401)  Pulse: 60 (07/05/19 0732)  Resp: 16 (07/05/19 0732)  BP: (!) 109/53 (07/05/19 0401)  SpO2: 96 % (07/05/19 0732) Vital Signs (24h Range):  Temp:  [96.9 °F (36.1 °C)-98.6 °F (37 °C)] 97.6 °F (36.4 °C)  Pulse:  [60-69] 60  Resp:  [16-18] 16  SpO2:  [92 %-100 %] 96 %  BP: (109-137)/(53-60) 109/53     Weight: 65.8 kg (145 lb)  Height: 5' 6" (167.6 cm)  Body mass index is 23.4 kg/m².      Intake/Output Summary (Last 24 hours) at 7/5/2019 0811  Last data filed at 7/4/2019 1800  Gross per 24 hour   Intake 580 ml   Output 1625 ml   Net -1045 ml       General    Nursing note and vitals reviewed.  Constitutional: She is oriented to person, place, and time. She appears well-developed and well-nourished.   Pulmonary/Chest: Effort normal.   Neurological: She is alert and oriented to person, place, and time.   Psychiatric: She has a normal mood and affect. Her behavior is normal.         Left Hip Exam     Comments:  LLE DNVI. Incision looks great.            Significant Labs:   CBC:   Recent Labs   Lab 07/04/19  0608   WBC 11.83   HGB 10.9*   HCT 34.2*        CMP: No results for input(s): NA, K, CL, CO2, GLU, BUN, "
Pt seen and examined f/u for ulcerative colitis    This morning he feels much better with about 3 semi formed BMs over past 24 hours. No abdominal pain, nausea or vomiting. No rectal bleeding. Intussusception resolved and was incidental finding. GI PCR negative.    REVIEW OF SYSTEMS:    CONSTITUTIONAL: No fever, weight loss, or fatigue  EYES: No eye pain, visual disturbances, or discharge  ENMT:  No difficulty hearing, tinnitus, vertigo; No sinus or throat pain  RESPIRATORY: No cough, wheezing, chills or hemoptysis; No shortness of breath  CARDIOVASCULAR: No chest pain, palpitations, dizziness, or leg swelling  GASTROINTESTINAL: as above    MEDICATIONS:  MEDICATIONS  (STANDING):  enoxaparin Injectable 40 milliGRAM(s) SubCutaneous daily  hydrocortisone sodium succinate Injectable 100 milliGRAM(s) IV Push three times a day  lactated ringers. 1000 milliLiter(s) (150 mL/Hr) IV Continuous <Continuous>    MEDICATIONS  (PRN):  acetaminophen   Tablet .. 650 milliGRAM(s) Oral every 6 hours PRN Temp greater or equal to 38C (100.4F), Mild Pain (1 - 3)  HYDROmorphone  Injectable 0.5 milliGRAM(s) IV Push every 4 hours PRN Moderate Pain (4 - 6)  HYDROmorphone  Injectable 1 milliGRAM(s) IV Push every 4 hours PRN Severe Pain (7 - 10)  ondansetron Injectable 4 milliGRAM(s) IV Push every 6 hours PRN Nausea and/or Vomiting      Allergies    No Known Allergies    Intolerances        Vital Signs Last 24 Hrs  T(C): 36.6 (13 May 2019 22:30), Max: 36.6 (13 May 2019 22:30)  T(F): 97.9 (13 May 2019 22:30), Max: 97.9 (13 May 2019 22:30)  HR: 74 (13 May 2019 22:30) (74 - 89)  BP: 123/72 (13 May 2019 22:30) (123/72 - 133/84)  BP(mean): --  RR: 18 (13 May 2019 22:30) (18 - 18)  SpO2: 95% (13 May 2019 22:30) (95% - 95%)    05-13 @ 07:01  -  05-14 @ 07:00  --------------------------------------------------------  IN: 1340 mL / OUT: 0 mL / NET: 1340 mL        PHYSICAL EXAM:    General: Well developed; well nourished; in no acute distress  HEENT: MMM, conjunctiva and sclera clear  Gastrointestinal:Abdomen: Soft non-tender non-distended; Normal bowel sounds; No hepatosplenomegaly  Extremities: no cyanosis, clubbing or edema.  Skin: Warm and dry. No obvious rash    LABS:      CBC Full  -  ( 13 May 2019 08:46 )  WBC Count : 5.9 K/uL  RBC Count : 4.47 M/uL  Hemoglobin : 12.7 g/dL  Hematocrit : 37.3 %  Platelet Count - Automated : 184 K/uL  Mean Cell Volume : 83.4 fl  Mean Cell Hemoglobin : 28.4 pg  Mean Cell Hemoglobin Concentration : 34.0 g/dL  Auto Neutrophil # : 4.1 K/uL  Auto Lymphocyte # : 1.0 K/uL  Auto Monocyte # : 0.8 K/uL  Auto Eosinophil # : 0.0 K/uL  Auto Basophil # : 0.0 K/uL  Auto Neutrophil % : 70.1 %  Auto Lymphocyte % : 16.3 %  Auto Monocyte % : 13.1 %  Auto Eosinophil % : 0.0 %  Auto Basophil % : 0.2 %    05-13    141  |  101  |  3.0<L>  ----------------------------<  105  3.5   |  25.0  |  0.76    Ca    8.2<L>      13 May 2019 08:46  Phos  3.9     05-13  Mg     1.9     05-13                        RADIOLOGY & ADDITIONAL STUDIES (The following images were personally reviewed):  < from: CT Abdomen and Pelvis w/ Oral Cont and w/ IV Cont (05.13.19 @ 16:06) >   EXAM:  CT ABDOMEN AND PELVIS OC IC                          PROCEDURE DATE:  05/13/2019          INTERPRETATION:  CLINICAL INFORMATION: Abdominal pain.     COMPARISON: May 11, 2019.    PROCEDURE:   CT of the Abdomen and Pelvis was performed with intravenous contrast.   Intravenous contrast: 90 ml Omnipaque 350. 10 ml discarded.  Oral contrast: positive contrast was administered.  Sagittal and coronal reformats were performed.    FINDINGS:    LOWER CHEST: Within normal limits.    LIVER: Within normal limits.  BILE DUCTS: Normal caliber.  GALLBLADDER: Within normal limits.  SPLEEN: Unchanged size of hypodense lesion in the posterior spleen,   measuring 1.8 cm.  PANCREAS: Within normal limits.  ADRENALS: Within normal limits.  KIDNEYS/URETERS: Within normal limits.    BLADDER: Within normal limits.  REPRODUCTIVE ORGANS: Prostate within normal limits.    BOWEL: Improved mural thickening of the colon and surrounding   inflammatory changes. Mucosal fat deposition in the sigmoid colon.   Previously seen small bowel intussusception has resolved. No bowel   obstruction. Appendix does not fill with contrast, but is otherwise   within normal limits.  PERITONEUM: No ascites.  VESSELS:  Within normal limits.  LYMPH NODES: Persistent prominent paracolic/mesorectal lymph nodes.    ABDOMINAL WALL: Within normal limits.  BONES: Within normal limits.    IMPRESSION:     Since May 11, 2019, improved proctocolitis.    Previously seen small bowel intussusception has resolved.                      FRANCESCO DAVIS M.D., ATTENDING RADIOLOGIST  This document has been electronically signed. May 13 2019  5:10PM                  < end of copied text >
CREATININE, CALCIUM, PROT, ALBUMIN, BILITOT, ALKPHOS, AST, ALT, ANIONGAP, EGFRNONAA in the last 48 hours.    Invalid input(s): [place]  All pertinent labs within the past 24 hours have been reviewed.    Significant Imaging: None  
INTERVAL HPI/OVERNIGHT EVENTS:    SUBJECTIVE:  INDIO overnight.  CT imaging showed no intussusception.  Pain resolved.  Pt having BMs and tolerating diet.  No sob/cp/f/c/n/v.      MEDICATIONS  (STANDING):  enoxaparin Injectable 40 milliGRAM(s) SubCutaneous daily  lactated ringers. 1000 milliLiter(s) (50 mL/Hr) IV Continuous <Continuous>  predniSONE   Tablet 50 milliGRAM(s) Oral daily    MEDICATIONS  (PRN):  acetaminophen   Tablet .. 650 milliGRAM(s) Oral every 6 hours PRN Temp greater or equal to 38C (100.4F), Mild Pain (1 - 3)  HYDROmorphone  Injectable 0.5 milliGRAM(s) IV Push every 4 hours PRN Moderate Pain (4 - 6)  HYDROmorphone  Injectable 1 milliGRAM(s) IV Push every 4 hours PRN Severe Pain (7 - 10)  ondansetron Injectable 4 milliGRAM(s) IV Push every 6 hours PRN Nausea and/or Vomiting      Vital Signs Last 24 Hrs  T(C): 36.8 (14 May 2019 08:12), Max: 36.8 (14 May 2019 08:12)  T(F): 98.2 (14 May 2019 08:12), Max: 98.2 (14 May 2019 08:12)  HR: 70 (14 May 2019 08:12) (70 - 89)  BP: 120/76 (14 May 2019 08:12) (120/76 - 133/84)  BP(mean): --  RR: 18 (14 May 2019 08:12) (18 - 18)  SpO2: 97% (14 May 2019 08:12) (95% - 97%)    PE  Gen: NAD  Pulm: nonlabored breathing  CV: RRR  Abd: soft, nt, nd  Ext:  no cyanosis, clubbing, or edema  Neuro: AAOx3, no cyanosis or clubbing      I&O's Detail    13 May 2019 07:01  -  14 May 2019 07:00  --------------------------------------------------------  IN:    IV PiggyBack: 50 mL    lactated ringers.: 1050 mL    Oral Fluid: 240 mL  Total IN: 1340 mL    OUT:  Total OUT: 0 mL    Total NET: 1340 mL          LABS:                        12.9   6.8   )-----------( 213      ( 14 May 2019 07:46 )             38.2     05-14    141  |  101  |  6.0<L>  ----------------------------<  118<H>  4.0   |  28.0  |  0.73    Ca    8.4<L>      14 May 2019 07:46  Phos  3.6     05-14  Mg     2.0     05-14            RADIOLOGY & ADDITIONAL STUDIES:    CT abd/pelv 5/13: Since May 11, 2019, improved proctocolitis.    Previously seen small bowel intussusception has resolved.
INTERVAL HPI/OVERNIGHT EVENTS:    SUBJECTIVE: INDIO overnight.  Patient has mild abdominal pain, but no fevers/chills/sob/cp/n/v.      MEDICATIONS  (STANDING):  enoxaparin Injectable 40 milliGRAM(s) SubCutaneous daily  hydrocortisone sodium succinate Injectable 100 milliGRAM(s) IV Push three times a day  lactated ringers. 1000 milliLiter(s) (150 mL/Hr) IV Continuous <Continuous>  piperacillin/tazobactam IVPB. 3.375 Gram(s) IV Intermittent every 8 hours    MEDICATIONS  (PRN):  acetaminophen   Tablet .. 650 milliGRAM(s) Oral every 6 hours PRN Temp greater or equal to 38C (100.4F), Mild Pain (1 - 3)  HYDROmorphone  Injectable 0.5 milliGRAM(s) IV Push every 4 hours PRN Moderate Pain (4 - 6)  HYDROmorphone  Injectable 1 milliGRAM(s) IV Push every 4 hours PRN Severe Pain (7 - 10)  ondansetron Injectable 4 milliGRAM(s) IV Push every 6 hours PRN Nausea and/or Vomiting      Vital Signs Last 24 Hrs  T(C): 36.4 (13 May 2019 08:00), Max: 37.6 (12 May 2019 21:16)  T(F): 97.6 (13 May 2019 08:00), Max: 99.7 (12 May 2019 21:16)  HR: 89 (13 May 2019 08:00) (89 - 112)  BP: 106/67 (13 May 2019 08:00) (106/67 - 139/76)  BP(mean): --  RR: 18 (13 May 2019 08:00) (18 - 20)  SpO2: 100% (13 May 2019 08:00) (97% - 100%)    PE  Gen: NAD  Pulm: nonlabored breathing  CV: RRR  Abd: soft, nt, nd  Ext: no cyanosis, clubbing, or edema  Neuro: AAOx3, no sensory or motor deficoits      I&O's Detail    13 May 2019 07:01  -  13 May 2019 12:24  --------------------------------------------------------  IN:    IV PiggyBack: 25 mL    lactated ringers.: 450 mL  Total IN: 475 mL    OUT:  Total OUT: 0 mL    Total NET: 475 mL          LABS:                        12.7   5.9   )-----------( 184      ( 13 May 2019 08:46 )             37.3     05-13    141  |  101  |  3.0<L>  ----------------------------<  105  3.5   |  25.0  |  0.76    Ca    8.2<L>      13 May 2019 08:46  Phos  3.9     05-13  Mg     1.9     05-13    TPro  7.0  /  Alb  4.0  /  TBili  0.5  /  DBili  x   /  AST  19  /  ALT  19  /  AlkPhos  72  05-11          RADIOLOGY & ADDITIONAL STUDIES:
Pt seen and examined. He states that his diarrhea has improved and is awaiting repeat CT of abdomen nad pelvis to re-evaluate previously seen SB intussusception     REVIEW OF SYSTEMS:  Constitutional: No fever, weight loss or fatigue  Cardiovascular: No chest pain, palpitations, dizziness or leg swelling  Gastrointestinal: As noted above.  Skin: No itching, burning, rashes or lesions       MEDICATIONS:  MEDICATIONS  (STANDING):  enoxaparin Injectable 40 milliGRAM(s) SubCutaneous daily  hydrocortisone sodium succinate Injectable 100 milliGRAM(s) IV Push three times a day  lactated ringers. 1000 milliLiter(s) (150 mL/Hr) IV Continuous <Continuous>  piperacillin/tazobactam IVPB. 3.375 Gram(s) IV Intermittent every 8 hours    MEDICATIONS  (PRN):  acetaminophen   Tablet .. 650 milliGRAM(s) Oral every 6 hours PRN Temp greater or equal to 38C (100.4F), Mild Pain (1 - 3)  HYDROmorphone  Injectable 0.5 milliGRAM(s) IV Push every 4 hours PRN Moderate Pain (4 - 6)  HYDROmorphone  Injectable 1 milliGRAM(s) IV Push every 4 hours PRN Severe Pain (7 - 10)  ondansetron Injectable 4 milliGRAM(s) IV Push every 6 hours PRN Nausea and/or Vomiting      Allergies    No Known Allergies    Intolerances        Vital Signs Last 24 Hrs  T(C): 37.4 (13 May 2019 02:34), Max: 37.6 (12 May 2019 21:16)  T(F): 99.3 (13 May 2019 02:34), Max: 99.7 (12 May 2019 21:16)  HR: 102 (13 May 2019 02:34) (98 - 112)  BP: 112/72 (13 May 2019 02:34) (112/72 - 139/76)  BP(mean): --  RR: 18 (13 May 2019 02:34) (18 - 20)  SpO2: 98% (13 May 2019 02:34) (97% - 98%)      PHYSICAL EXAM:    General: Well developed; well nourished; in no acute distress  HEENT: MMM, conjunctiva pink and sclera anicteric.  Lungs: clear to auscultation bilaterally.  Cor: RRR S1, S2 only.  Gastrointestinal: Abdomen: Soft non-tender non-distended; Normal bowel sounds; No hepatosplenomegaly.  Extremities: no cyanosis, clubbing or edema.  Skin: Warm and dry. No obvious rash  Neuro: Pt. a + o x 3    LABS:  CBC Full  -  ( 12 May 2019 09:05 )  WBC Count : 7.0 K/uL  RBC Count : 4.48 M/uL  Hemoglobin : 12.7 g/dL  Hematocrit : 38.3 %  Platelet Count - Automated : 177 K/uL  Mean Cell Volume : 85.5 fl  Mean Cell Hemoglobin : 28.3 pg  Mean Cell Hemoglobin Concentration : 33.2 g/dL  Auto Neutrophil # : 4.4 K/uL  Auto Lymphocyte # : 1.3 K/uL  Auto Monocyte # : 1.2 K/uL  Auto Eosinophil # : 0.1 K/uL  Auto Basophil # : 0.0 K/uL  Auto Neutrophil % : 63.4 %  Auto Lymphocyte % : 18.7 %  Auto Monocyte % : 16.5 %  Auto Eosinophil % : 1.3 %  Auto Basophil % : 0.1 %    05-12    140  |  103  |  5.0<L>  ----------------------------<  91  3.8   |  24.0  |  0.77    Ca    8.5<L>      12 May 2019 09:05  Phos  3.3     05-12  Mg     1.4     05-12    TPro  7.0  /  Alb  4.0  /  TBili  0.5  /  DBili  x   /  AST  19  /  ALT  19  /  AlkPhos  72  05-11                      RADIOLOGY & ADDITIONAL STUDIES (The following images were personally reviewed):

## 2019-08-20 LAB
ALBUMIN SERPL ELPH-MCNC: 5 G/DL
ALP BLD-CCNC: 59 U/L
ALT SERPL-CCNC: 17 U/L
ANION GAP SERPL CALC-SCNC: 16 MMOL/L
AST SERPL-CCNC: 15 U/L
BASOPHILS # BLD AUTO: 0.02 K/UL
BASOPHILS NFR BLD AUTO: 0.3 %
BILIRUB SERPL-MCNC: 1.1 MG/DL
BUN SERPL-MCNC: 13 MG/DL
CALCIUM SERPL-MCNC: 10.2 MG/DL
CHLORIDE SERPL-SCNC: 102 MMOL/L
CO2 SERPL-SCNC: 25 MMOL/L
CREAT SERPL-MCNC: 1.1 MG/DL
CRP SERPL-MCNC: <0.1 MG/DL
EOSINOPHIL # BLD AUTO: 0.06 K/UL
EOSINOPHIL NFR BLD AUTO: 1 %
GLUCOSE SERPL-MCNC: 103 MG/DL
HCT VFR BLD CALC: 46.9 %
HGB BLD-MCNC: 15.3 G/DL
IMM GRANULOCYTES NFR BLD AUTO: 0.2 %
INR PPP: 0.94 RATIO
LYMPHOCYTES # BLD AUTO: 1.94 K/UL
LYMPHOCYTES NFR BLD AUTO: 30.8 %
MAN DIFF?: NORMAL
MCHC RBC-ENTMCNC: 28.4 PG
MCHC RBC-ENTMCNC: 32.6 GM/DL
MCV RBC AUTO: 87 FL
MONOCYTES # BLD AUTO: 0.55 K/UL
MONOCYTES NFR BLD AUTO: 8.7 %
NEUTROPHILS # BLD AUTO: 3.71 K/UL
NEUTROPHILS NFR BLD AUTO: 59 %
PLATELET # BLD AUTO: 226 K/UL
POTASSIUM SERPL-SCNC: 4.8 MMOL/L
PT BLD: 10.8 SEC
RBC # BLD: 5.39 M/UL
RBC # FLD: 11.9 %
SODIUM SERPL-SCNC: 143 MMOL/L
WBC # FLD AUTO: 6.29 K/UL

## 2019-08-21 LAB
HBV CORE IGG+IGM SER QL: NONREACTIVE
HBV SURFACE AB SER QL: REACTIVE
HBV SURFACE AG SER QL: NONREACTIVE
HCV AB SER QL: NONREACTIVE
HCV S/CO RATIO: 0.1 S/CO
HEPATITIS A IGG ANTIBODY: NONREACTIVE

## 2019-08-23 LAB
M TB IFN-G BLD-IMP: ABNORMAL
QUANTIFERON TB PLUS MITOGEN MINUS NIL: 0.01 IU/ML
QUANTIFERON TB PLUS NIL: 0 IU/ML
QUANTIFERON TB PLUS TB1 MINUS NIL: 0.01 IU/ML
QUANTIFERON TB PLUS TB2 MINUS NIL: 0.01 IU/ML

## 2019-08-30 ENCOUNTER — APPOINTMENT (OUTPATIENT)
Dept: GASTROENTEROLOGY | Facility: CLINIC | Age: 29
End: 2019-08-30
Payer: COMMERCIAL

## 2019-08-30 PROCEDURE — 96413 CHEMO IV INFUSION 1 HR: CPT

## 2019-08-30 PROCEDURE — 96415 CHEMO IV INFUSION ADDL HR: CPT

## 2019-08-30 PROCEDURE — 99213 OFFICE O/P EST LOW 20 MIN: CPT | Mod: 25

## 2019-08-30 NOTE — HISTORY OF PRESENT ILLNESS
[de-identified] : He has universal ulcerative colitis and is here for his initial infusion of Remicade in a dose of 5 mg per kilogram. He tolerated the infusion without difficulty and is feeling well.

## 2019-08-30 NOTE — ASSESSMENT
[FreeTextEntry1] : At this time he is doing well we'll continue with the same medical regimen. He will undergo followup Remicade infusion in 2 weeks.

## 2019-08-30 NOTE — PHYSICAL EXAM
[General Appearance - Alert] : alert [General Appearance - In No Acute Distress] : in no acute distress [General Appearance - Well Nourished] : well nourished [General Appearance - Well Developed] : well developed [General Appearance - Well-Appearing] : healthy appearing [Sclera] : the sclera and conjunctiva were normal [PERRL With Normal Accommodation] : pupils were equal in size, round, and reactive to light [Extraocular Movements] : extraocular movements were intact [Outer Ear] : the ears and nose were normal in appearance [Hearing Threshold Finger Rub Not [place]] : hearing was normal [Oropharynx] : the oropharynx was normal [Examination Of The Oral Cavity] : the lips and gums were normal [Neck Appearance] : the appearance of the neck was normal [Heart Rate And Rhythm] : heart rate was normal and rhythm regular [Bowel Sounds] : normal bowel sounds [Abdomen Soft] : soft [Abdomen Tenderness] : non-tender [Abdomen Mass (___ Cm)] : no abdominal mass palpated [No CVA Tenderness] : no ~M costovertebral angle tenderness [No Spinal Tenderness] : no spinal tenderness [Abnormal Walk] : normal gait [Nail Clubbing] : no clubbing  or cyanosis of the fingernails [Motor Tone] : muscle strength and tone were normal [Musculoskeletal - Swelling] : no joint swelling seen [Skin Color & Pigmentation] : normal skin color and pigmentation [Skin Turgor] : normal skin turgor [] : no rash [Motor Exam] : the motor exam was normal [No Focal Deficits] : no focal deficits [Oriented To Time, Place, And Person] : oriented to person, place, and time [Affect] : the affect was normal [Impaired Insight] : insight and judgment were intact

## 2019-09-05 ENCOUNTER — APPOINTMENT (OUTPATIENT)
Dept: GASTROENTEROLOGY | Facility: CLINIC | Age: 29
End: 2019-09-05

## 2019-09-13 ENCOUNTER — APPOINTMENT (OUTPATIENT)
Dept: GASTROENTEROLOGY | Facility: CLINIC | Age: 29
End: 2019-09-13
Payer: COMMERCIAL

## 2019-09-13 PROCEDURE — 96415 CHEMO IV INFUSION ADDL HR: CPT

## 2019-09-13 PROCEDURE — 99213 OFFICE O/P EST LOW 20 MIN: CPT | Mod: 25

## 2019-09-13 PROCEDURE — 96413 CHEMO IV INFUSION 1 HR: CPT

## 2019-09-13 NOTE — ASSESSMENT
[FreeTextEntry1] : At this time he is doing well will simply continue with the same medical regimen although I had recommended that he take Metamucil on a daily basis. He will return in 4 weeks for his third infusion of Remicade.

## 2019-09-13 NOTE — HISTORY OF PRESENT ILLNESS
[de-identified] : The patient is here for a second infusion of Remicade at a dose of 5 mg per kilogram per his underlying universal ulcerative colitis. He is tolerating the infusion without difficulty had no side effects from his initial infusion on August 30. No recently experiencing constipation and lower abdominal cramping which was also take Metamucil and the symptoms gradually resolving although he is only taking the fiber supplement intermittently.

## 2019-09-13 NOTE — PHYSICAL EXAM
[General Appearance - Alert] : alert [General Appearance - In No Acute Distress] : in no acute distress [General Appearance - Well Nourished] : well nourished [General Appearance - Well Developed] : well developed [General Appearance - Well-Appearing] : healthy appearing [Sclera] : the sclera and conjunctiva were normal [PERRL With Normal Accommodation] : pupils were equal in size, round, and reactive to light [Extraocular Movements] : extraocular movements were intact [Hearing Threshold Finger Rub Not [place]] : hearing was normal [Outer Ear] : the ears and nose were normal in appearance [Oropharynx] : the oropharynx was normal [Examination Of The Oral Cavity] : the lips and gums were normal [Neck Appearance] : the appearance of the neck was normal [Heart Rate And Rhythm] : heart rate was normal and rhythm regular [Bowel Sounds] : normal bowel sounds [Abdomen Soft] : soft [Abdomen Tenderness] : non-tender [Abdomen Mass (___ Cm)] : no abdominal mass palpated [No Spinal Tenderness] : no spinal tenderness [No CVA Tenderness] : no ~M costovertebral angle tenderness [Abnormal Walk] : normal gait [Nail Clubbing] : no clubbing  or cyanosis of the fingernails [Musculoskeletal - Swelling] : no joint swelling seen [Motor Tone] : muscle strength and tone were normal [Skin Color & Pigmentation] : normal skin color and pigmentation [Skin Turgor] : normal skin turgor [] : no rash [Motor Exam] : the motor exam was normal [No Focal Deficits] : no focal deficits [Oriented To Time, Place, And Person] : oriented to person, place, and time [Impaired Insight] : insight and judgment were intact [Affect] : the affect was normal

## 2019-10-11 ENCOUNTER — OTHER (OUTPATIENT)
Age: 29
End: 2019-10-11

## 2019-10-16 ENCOUNTER — APPOINTMENT (OUTPATIENT)
Dept: GASTROENTEROLOGY | Facility: CLINIC | Age: 29
End: 2019-10-16
Payer: COMMERCIAL

## 2019-10-16 VITALS
HEART RATE: 86 BPM | RESPIRATION RATE: 18 BRPM | HEIGHT: 67 IN | OXYGEN SATURATION: 98 % | DIASTOLIC BLOOD PRESSURE: 76 MMHG | SYSTOLIC BLOOD PRESSURE: 122 MMHG | TEMPERATURE: 98.5 F | WEIGHT: 144 LBS | BODY MASS INDEX: 22.6 KG/M2

## 2019-10-16 DIAGNOSIS — R76.12 NONSPECIFIC REACTION TO CELL MEDIATED IMMUNITY MEASUREMENT OF GAMMA INTERFERON ANTIGEN RESPONSE W/OUT ACTIVE TUBERCULOSIS: ICD-10-CM

## 2019-10-16 PROCEDURE — 96413 CHEMO IV INFUSION 1 HR: CPT

## 2019-10-16 PROCEDURE — 96415 CHEMO IV INFUSION ADDL HR: CPT

## 2019-10-16 PROCEDURE — 99213 OFFICE O/P EST LOW 20 MIN: CPT | Mod: 25

## 2019-10-16 NOTE — PHYSICAL EXAM
[General Appearance - Alert] : alert [General Appearance - In No Acute Distress] : in no acute distress [Sclera] : the sclera and conjunctiva were normal [PERRL With Normal Accommodation] : pupils were equal in size, round, and reactive to light [Extraocular Movements] : extraocular movements were intact [Auscultation Breath Sounds / Voice Sounds] : lungs were clear to auscultation bilaterally [Heart Rate And Rhythm] : heart rate was normal and rhythm regular [Heart Sounds] : normal S1 and S2 [Heart Sounds Gallop] : no gallops [Murmurs] : no murmurs [Heart Sounds Pericardial Friction Rub] : no pericardial rub [Bowel Sounds] : normal bowel sounds [Abdomen Soft] : soft [Abdomen Tenderness] : non-tender [] : no hepato-splenomegaly [Abdomen Mass (___ Cm)] : no abdominal mass palpated

## 2019-10-16 NOTE — ASSESSMENT
[FreeTextEntry1] : Successful third infusion of Remicade at 5 mg per kilogram per dose. Patient successfully was infused with 400 mg of Remicade and infusion was uncomplicated. This concludes the introductory dosing of the Remicade. Patient seems remain a clinical response. Recent blood work acceptable. Interferon gold was indeterminate. Chest x-ray was requested by EVELIN WEBER.\par Plan repeat Remicade infusion in 8 weeks at 5 mg per kilogram i.e. 400 mg of Remicade. Same use Aricept 3 mg per day. GI office followup electively with [facility] for adjustment of medications.

## 2019-10-16 NOTE — HISTORY OF PRESENT ILLNESS
[FreeTextEntry1] : 28-year-old  male with history of ulcerative colitis, and CHF 16, i.e. 12 years. Patient initially presented with irreversible colitis. He was treated with steroids and salicylate medications he is not off these medications for a number of years. A colonoscopy in 2014, showed proctitis, by Beck Levy. She has subsequently had a colonoscopy about 3 years ago with Dr. Antwan Pierre in [place]. Results unknown. Patient more recently has been seen by [facility] and colonoscopy suggested but never performed. Patient has recently been treated the ER visits locally. CAT scan and blood work have recently been done. Patient was started on Remicade infusion on 8/30 and had a second infusion on 9/13. He is receiving 5 mg per kilogram and presents for his third introductory dose. Prednisone has been converted to use Ramiro U. Sarris has been tapered from 9, down to 3 mg per day. Patient is reporting some response to the Remicade. Diarrhea has resolved. He has occasional constipation and continues to have some anorexia. Bowel movements vary between one and 3 per day. He may be hard and he has occasional constipation. Patient had distantly been using salicylate medications, but not recently.all recent blood work is normal.\par Patient was premedicated with Tylenol and Zantac. Over 2.5 pounds 100 mg of Remicade was infused uneventfully. No medication side effects or hypotension. Patient completed. The infusion on eventfully. No effusion or allergic reaction.

## 2019-12-06 ENCOUNTER — APPOINTMENT (OUTPATIENT)
Dept: GASTROENTEROLOGY | Facility: CLINIC | Age: 29
End: 2019-12-06

## 2019-12-09 ENCOUNTER — APPOINTMENT (OUTPATIENT)
Dept: GASTROENTEROLOGY | Facility: CLINIC | Age: 29
End: 2019-12-09

## 2020-01-30 ENCOUNTER — APPOINTMENT (OUTPATIENT)
Dept: GASTROENTEROLOGY | Facility: CLINIC | Age: 30
End: 2020-01-30
Payer: SELF-PAY

## 2020-01-30 PROCEDURE — SNS01: CPT

## 2020-02-06 ENCOUNTER — APPOINTMENT (OUTPATIENT)
Dept: GASTROENTEROLOGY | Facility: CLINIC | Age: 30
End: 2020-02-06

## 2020-05-01 ENCOUNTER — APPOINTMENT (OUTPATIENT)
Dept: GASTROENTEROLOGY | Facility: CLINIC | Age: 30
End: 2020-05-01
Payer: COMMERCIAL

## 2020-05-01 DIAGNOSIS — K51.00 ULCERATIVE (CHRONIC) PANCOLITIS W/OUT COMPLICATIONS: ICD-10-CM

## 2020-05-01 PROCEDURE — 99212 OFFICE O/P EST SF 10 MIN: CPT | Mod: 95

## 2020-05-01 RX ORDER — MESALAMINE 0.38 G/1
0.38 CAPSULE, EXTENDED RELEASE ORAL
Qty: 120 | Refills: 5 | Status: ACTIVE | COMMUNITY
Start: 2020-05-01 | End: 1900-01-01

## 2020-05-01 NOTE — ASSESSMENT
[FreeTextEntry1] : At this time on concerned about the fact that he may develop antibodies to the Remicade as a result of stopping his infusions. In addition he is only minimally symptomatic. Therefore I simply recommended that he start Apriso 4 tablets every morning. He should make a followup appointment with me in several months.

## 2020-05-01 NOTE — HISTORY OF PRESENT ILLNESS
[Home] : at home, [unfilled] , at the time of the visit. [Medical Office: ([facility])___] : at the medical office located in  [Patient] : the patient [___ Month(s) Ago] : [unfilled] month(s) ago [Self] : self [None] : no changes [de-identified] : The patient was diagnosed with universal ulcerative colitis at the age of 16 at which time he underwent a colonoscopy confirming the diagnosis. Ever since that time subsequent colonoscopies have revealed minimal proctitis as was the case the time of his last colonoscopy in 2014 or mild left-sided colitis. He was hospitalized last year with a flare of his ongoing ulcerative colitis in the fall of 2019 he was started on Remicade. After his third infusion he failed to followup for any further infusions claiming that he felt too sick to come to the office. There has been no contact with him since mid December of 2019. Today he notes that he is feeling fairly well averaging one to 2 normal bowel movements daily. On occasion his stools will be loose and may be a small amount of blood in the commode. Will occasionally experience some nocturnal lower abdominal cramping. There is no nausea or vomiting. He denies any fever. His appetite and weight are stable.

## 2020-09-14 NOTE — ED ADULT NURSE NOTE - PAIN: PRESENCE, MLM
[Post Operative Visit] : a post operative visit for [Artificial Hip Joint] : artificial hip joint [FreeTextEntry2] : artificial hip joint complains of pain/discomfort
